# Patient Record
Sex: FEMALE | Race: WHITE
[De-identification: names, ages, dates, MRNs, and addresses within clinical notes are randomized per-mention and may not be internally consistent; named-entity substitution may affect disease eponyms.]

---

## 2021-04-17 ENCOUNTER — HOSPITAL ENCOUNTER (EMERGENCY)
Dept: HOSPITAL 94 - ER | Age: 46
Discharge: HOME | End: 2021-04-17
Payer: COMMERCIAL

## 2021-04-17 VITALS — BODY MASS INDEX: 24.66 KG/M2 | HEIGHT: 68 IN | WEIGHT: 162.7 LBS

## 2021-04-17 VITALS — DIASTOLIC BLOOD PRESSURE: 69 MMHG | SYSTOLIC BLOOD PRESSURE: 119 MMHG

## 2021-04-17 DIAGNOSIS — Z85.3: ICD-10-CM
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
## 2021-04-17 DIAGNOSIS — S05.91XA: Primary | ICD-10-CM

## 2021-04-17 DIAGNOSIS — H11.31: ICD-10-CM
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
## 2021-04-17 PROCEDURE — 90715 TDAP VACCINE 7 YRS/> IM: CPT

## 2021-04-17 PROCEDURE — 99283 EMERGENCY DEPT VISIT LOW MDM: CPT

## 2021-04-17 NOTE — NUR
Last refill of Pioglitazone #90 with 0 refills 8/5/19  Last office visit 8/22/19. Due for follow up November.    Forwarded to Dr. Valdivia for review   PT HAS BEEN EVALUATED BY PROVIDER, WAITING FOR REEVALUATION